# Patient Record
Sex: MALE | Race: WHITE | NOT HISPANIC OR LATINO
[De-identification: names, ages, dates, MRNs, and addresses within clinical notes are randomized per-mention and may not be internally consistent; named-entity substitution may affect disease eponyms.]

---

## 2017-09-01 PROBLEM — Z00.00 ENCOUNTER FOR PREVENTIVE HEALTH EXAMINATION: Status: ACTIVE | Noted: 2017-09-01

## 2017-09-19 ENCOUNTER — APPOINTMENT (OUTPATIENT)
Dept: PULMONOLOGY | Facility: CLINIC | Age: 67
End: 2017-09-19
Payer: MEDICARE

## 2017-09-19 VITALS — SYSTOLIC BLOOD PRESSURE: 126 MMHG | DIASTOLIC BLOOD PRESSURE: 82 MMHG

## 2017-09-19 VITALS — HEIGHT: 71 IN | WEIGHT: 211 LBS | BODY MASS INDEX: 29.54 KG/M2

## 2017-09-19 VITALS — HEART RATE: 54 BPM | OXYGEN SATURATION: 97 %

## 2017-09-19 DIAGNOSIS — Z86.39 PERSONAL HISTORY OF OTHER ENDOCRINE, NUTRITIONAL AND METABOLIC DISEASE: ICD-10-CM

## 2017-09-19 DIAGNOSIS — Z86.79 PERSONAL HISTORY OF OTHER DISEASES OF THE CIRCULATORY SYSTEM: ICD-10-CM

## 2017-09-19 DIAGNOSIS — Z82.49 FAMILY HISTORY OF ISCHEMIC HEART DISEASE AND OTHER DISEASES OF THE CIRCULATORY SYSTEM: ICD-10-CM

## 2017-09-19 PROCEDURE — 99205 OFFICE O/P NEW HI 60 MIN: CPT | Mod: 25

## 2017-09-19 PROCEDURE — 94010 BREATHING CAPACITY TEST: CPT

## 2017-09-19 RX ORDER — DIPHENHYDRAMINE HYDROCHLORIDE, ZINC ACETATE 20; 1 MG/G; MG/G
CREAM TOPICAL
Refills: 0 | Status: ACTIVE | COMMUNITY

## 2017-09-19 RX ORDER — FAMOTIDINE 20 MG/1
20 TABLET, FILM COATED ORAL
Refills: 0 | Status: ACTIVE | COMMUNITY

## 2017-09-19 RX ORDER — OMEGA-3/DHA/EPA/FISH OIL 300-1000MG
400 CAPSULE ORAL
Refills: 0 | Status: ACTIVE | COMMUNITY

## 2017-09-19 RX ORDER — DUTASTERIDE 0.5 MG/1
0.5 CAPSULE, LIQUID FILLED ORAL
Refills: 0 | Status: ACTIVE | COMMUNITY

## 2017-09-19 RX ORDER — MULTIVIT-MIN/FA/LYCOPEN/LUTEIN .4-300-25
TABLET ORAL
Refills: 0 | Status: ACTIVE | COMMUNITY

## 2017-09-19 RX ORDER — ROSUVASTATIN CALCIUM 10 MG/1
10 TABLET, FILM COATED ORAL
Refills: 0 | Status: ACTIVE | COMMUNITY

## 2017-09-19 RX ORDER — OMEGA-3S/DHA/EPA/FISH OIL 300-1000MG
CAPSULE ORAL
Refills: 0 | Status: ACTIVE | COMMUNITY

## 2017-10-22 ENCOUNTER — FORM ENCOUNTER (OUTPATIENT)
Age: 67
End: 2017-10-22

## 2017-10-23 ENCOUNTER — OUTPATIENT (OUTPATIENT)
Dept: OUTPATIENT SERVICES | Facility: HOSPITAL | Age: 67
LOS: 1 days | End: 2017-10-23
Payer: MEDICARE

## 2017-10-23 ENCOUNTER — APPOINTMENT (OUTPATIENT)
Dept: CT IMAGING | Facility: CLINIC | Age: 67
End: 2017-10-23
Payer: MEDICARE

## 2017-10-23 DIAGNOSIS — R91.8 OTHER NONSPECIFIC ABNORMAL FINDING OF LUNG FIELD: ICD-10-CM

## 2017-10-23 DIAGNOSIS — R05 COUGH: ICD-10-CM

## 2017-10-23 PROCEDURE — 71250 CT THORAX DX C-: CPT | Mod: 26

## 2017-10-23 PROCEDURE — 71250 CT THORAX DX C-: CPT

## 2017-12-15 ENCOUNTER — OUTPATIENT (OUTPATIENT)
Dept: OUTPATIENT SERVICES | Facility: HOSPITAL | Age: 67
LOS: 1 days | End: 2017-12-15
Payer: MEDICARE

## 2017-12-15 DIAGNOSIS — G47.33 OBSTRUCTIVE SLEEP APNEA (ADULT) (PEDIATRIC): ICD-10-CM

## 2017-12-15 PROCEDURE — 95811 POLYSOM 6/>YRS CPAP 4/> PARM: CPT

## 2017-12-15 PROCEDURE — 95811 POLYSOM 6/>YRS CPAP 4/> PARM: CPT | Mod: 26

## 2018-01-05 ENCOUNTER — APPOINTMENT (OUTPATIENT)
Dept: PULMONOLOGY | Facility: CLINIC | Age: 68
End: 2018-01-05
Payer: MEDICARE

## 2018-01-05 VITALS
HEART RATE: 58 BPM | BODY MASS INDEX: 29.4 KG/M2 | DIASTOLIC BLOOD PRESSURE: 78 MMHG | HEIGHT: 71 IN | WEIGHT: 210 LBS | SYSTOLIC BLOOD PRESSURE: 128 MMHG | OXYGEN SATURATION: 98 %

## 2018-01-05 PROCEDURE — 99214 OFFICE O/P EST MOD 30 MIN: CPT

## 2018-01-05 RX ORDER — METOPROLOL TARTRATE 50 MG/1
50 TABLET, FILM COATED ORAL
Refills: 0 | Status: DISCONTINUED | COMMUNITY
End: 2018-01-05

## 2018-05-21 ENCOUNTER — FORM ENCOUNTER (OUTPATIENT)
Age: 68
End: 2018-05-21

## 2018-05-21 ENCOUNTER — APPOINTMENT (OUTPATIENT)
Dept: PULMONOLOGY | Facility: CLINIC | Age: 68
End: 2018-05-21
Payer: MEDICARE

## 2018-05-21 VITALS
SYSTOLIC BLOOD PRESSURE: 124 MMHG | BODY MASS INDEX: 31.24 KG/M2 | WEIGHT: 224 LBS | DIASTOLIC BLOOD PRESSURE: 66 MMHG | HEART RATE: 51 BPM

## 2018-05-21 VITALS — OXYGEN SATURATION: 97 %

## 2018-05-21 PROCEDURE — 94060 EVALUATION OF WHEEZING: CPT

## 2018-05-21 PROCEDURE — 99215 OFFICE O/P EST HI 40 MIN: CPT | Mod: 25

## 2018-05-21 PROCEDURE — 94664 DEMO&/EVAL PT USE INHALER: CPT | Mod: 59

## 2018-05-22 ENCOUNTER — APPOINTMENT (OUTPATIENT)
Dept: CT IMAGING | Facility: CLINIC | Age: 68
End: 2018-05-22
Payer: MEDICARE

## 2018-05-22 ENCOUNTER — OUTPATIENT (OUTPATIENT)
Dept: OUTPATIENT SERVICES | Facility: HOSPITAL | Age: 68
LOS: 1 days | End: 2018-05-22
Payer: MEDICARE

## 2018-05-22 DIAGNOSIS — R91.8 OTHER NONSPECIFIC ABNORMAL FINDING OF LUNG FIELD: ICD-10-CM

## 2018-05-22 PROCEDURE — 71250 CT THORAX DX C-: CPT | Mod: 26

## 2018-05-22 PROCEDURE — 71250 CT THORAX DX C-: CPT

## 2018-06-27 ENCOUNTER — APPOINTMENT (OUTPATIENT)
Dept: PULMONOLOGY | Facility: CLINIC | Age: 68
End: 2018-06-27
Payer: MEDICARE

## 2018-06-27 VITALS
DIASTOLIC BLOOD PRESSURE: 46 MMHG | BODY MASS INDEX: 30.52 KG/M2 | SYSTOLIC BLOOD PRESSURE: 112 MMHG | HEART RATE: 48 BPM | HEIGHT: 71 IN | WEIGHT: 218 LBS | OXYGEN SATURATION: 97 %

## 2018-06-27 PROCEDURE — 99214 OFFICE O/P EST MOD 30 MIN: CPT

## 2018-06-27 RX ORDER — ALFUZOSIN HYDROCHLORIDE 10 MG/1
10 TABLET, EXTENDED RELEASE ORAL
Qty: 30 | Refills: 0 | Status: ACTIVE | COMMUNITY
Start: 2018-06-22

## 2018-08-20 ENCOUNTER — APPOINTMENT (OUTPATIENT)
Dept: PULMONOLOGY | Facility: CLINIC | Age: 68
End: 2018-08-20
Payer: MEDICARE

## 2018-08-20 VITALS
WEIGHT: 226 LBS | HEART RATE: 64 BPM | SYSTOLIC BLOOD PRESSURE: 130 MMHG | OXYGEN SATURATION: 97 % | HEIGHT: 71 IN | DIASTOLIC BLOOD PRESSURE: 70 MMHG | BODY MASS INDEX: 31.64 KG/M2

## 2018-08-20 PROCEDURE — 99214 OFFICE O/P EST MOD 30 MIN: CPT

## 2018-08-21 ENCOUNTER — APPOINTMENT (OUTPATIENT)
Dept: PULMONOLOGY | Facility: CLINIC | Age: 68
End: 2018-08-21
Payer: MEDICARE

## 2018-08-21 VITALS
HEIGHT: 71 IN | BODY MASS INDEX: 31.36 KG/M2 | SYSTOLIC BLOOD PRESSURE: 130 MMHG | WEIGHT: 224 LBS | HEART RATE: 51 BPM | DIASTOLIC BLOOD PRESSURE: 78 MMHG | OXYGEN SATURATION: 98 %

## 2018-08-21 PROCEDURE — 99215 OFFICE O/P EST HI 40 MIN: CPT

## 2018-12-20 ENCOUNTER — APPOINTMENT (OUTPATIENT)
Dept: PULMONOLOGY | Facility: CLINIC | Age: 68
End: 2018-12-20
Payer: MEDICARE

## 2018-12-20 VITALS
DIASTOLIC BLOOD PRESSURE: 74 MMHG | HEART RATE: 51 BPM | OXYGEN SATURATION: 98 % | SYSTOLIC BLOOD PRESSURE: 120 MMHG | WEIGHT: 220 LBS | BODY MASS INDEX: 30.68 KG/M2

## 2018-12-20 PROCEDURE — 99214 OFFICE O/P EST MOD 30 MIN: CPT

## 2018-12-20 RX ORDER — FLUTICASONE PROPIONATE AND SALMETEROL 50; 250 UG/1; UG/1
250-50 POWDER RESPIRATORY (INHALATION)
Refills: 0 | Status: DISCONTINUED | COMMUNITY
End: 2018-12-20

## 2018-12-20 RX ORDER — DONEPEZIL HYDROCHLORIDE 5 MG/1
5 TABLET ORAL
Refills: 0 | Status: ACTIVE | COMMUNITY

## 2019-05-21 ENCOUNTER — APPOINTMENT (OUTPATIENT)
Dept: PULMONOLOGY | Facility: CLINIC | Age: 69
End: 2019-05-21
Payer: MEDICARE

## 2019-05-21 VITALS
SYSTOLIC BLOOD PRESSURE: 110 MMHG | WEIGHT: 216 LBS | OXYGEN SATURATION: 97 % | DIASTOLIC BLOOD PRESSURE: 72 MMHG | BODY MASS INDEX: 30.13 KG/M2 | HEART RATE: 48 BPM | RESPIRATION RATE: 16 BRPM

## 2019-05-21 PROCEDURE — 99214 OFFICE O/P EST MOD 30 MIN: CPT

## 2019-05-21 RX ORDER — AZELASTINE HYDROCHLORIDE AND FLUTICASONE PROPIONATE 137; 50 UG/1; UG/1
137-50 SPRAY, METERED NASAL
Refills: 0 | Status: DISCONTINUED | COMMUNITY
End: 2019-05-21

## 2019-05-21 RX ORDER — ALBUTEROL SULFATE 90 UG/1
108 (90 BASE) AEROSOL, METERED RESPIRATORY (INHALATION)
Qty: 1 | Refills: 3 | Status: ACTIVE | COMMUNITY
Start: 2019-05-21 | End: 1900-01-01

## 2019-05-21 RX ORDER — FLUTICASONE PROPIONATE AND SALMETEROL 50; 250 UG/1; UG/1
250-50 POWDER RESPIRATORY (INHALATION)
Qty: 1 | Refills: 5 | Status: DISCONTINUED | COMMUNITY
Start: 2018-05-21 | End: 2019-05-21

## 2019-05-21 NOTE — DISCUSSION/SUMMARY
[FreeTextEntry1] : \par #1. CPAP at 11 cm of water to treat severe LENY but given residual moderate LENY increased to 14 cm of water with some improvement now down to a residual AHI of 12.8-14.9; he does not want to increase the pressure any higher given his symptomatic improvement but could consider autoCPAP if needed\par #2. Diet and exercise for weight loss\par #3. Replace equipment as needed; gave pt Airtouch F20 med FFM in the past and he feels that he is doing better with it despite the residual mild to moderate LENY noted on the compliance; consider AirFit F30 mask to improve response\par #4. F/u in 3 months with compliance\par #5. F/u with Dr. Villareal for other pulmonary issues\par #6. Flonase for PNDS\par #7. He is using SoClean\par \par Discussed above with patient and wife who was also present.

## 2019-05-21 NOTE — REASON FOR VISIT
[Follow-Up] : a follow-up visit [Sleep Apnea] : sleep apnea [Spouse] : spouse [FreeTextEntry2] : weight issues

## 2019-05-21 NOTE — CONSULT LETTER
[Dear  ___] : Dear  [unfilled], [Consult Letter:] : I had the pleasure of evaluating your patient, [unfilled]. [Please see my note below.] : Please see my note below. [Consult Closing:] : Thank you very much for allowing me to participate in the care of this patient.  If you have any questions, please do not hesitate to contact me. [Sincerely,] : Sincerely, [Logan Means MD] : Logan Means MD [FreeTextEntry3] : Logan Means MD, FCCP, KIT. ABSM\par

## 2019-05-21 NOTE — HISTORY OF PRESENT ILLNESS
[Intermittent] : intermittent [Doing Well] : doing well [Well Controlled] : Well controlled [Goals--Doing Well] : the patient is doing well with ~his/her~ asthma goals [Follow-Up - Routine Clinic] : a routine clinic follow-up of [None] : The patient is not currently being treated for this problem [FreeTextEntry1] : Patient c/o SOBOE but is otherwise without associated respiratory complaints.\par \par He is followed by Dr. Villareal for his abnormal chest CT/nodules which reportedly have been stable and for his asthma for which I have prescribed ProAir as needed.\par He reports improvement in his asthma and PNDS symptoms since starting CPAP with improvement in his cough.\par

## 2019-08-27 ENCOUNTER — APPOINTMENT (OUTPATIENT)
Dept: PULMONOLOGY | Facility: CLINIC | Age: 69
End: 2019-08-27

## 2019-09-03 ENCOUNTER — FORM ENCOUNTER (OUTPATIENT)
Age: 69
End: 2019-09-03

## 2019-09-04 ENCOUNTER — APPOINTMENT (OUTPATIENT)
Dept: PULMONOLOGY | Facility: CLINIC | Age: 69
End: 2019-09-04
Payer: MEDICARE

## 2019-09-04 ENCOUNTER — OUTPATIENT (OUTPATIENT)
Dept: OUTPATIENT SERVICES | Facility: HOSPITAL | Age: 69
LOS: 1 days | End: 2019-09-04
Payer: MEDICARE

## 2019-09-04 ENCOUNTER — APPOINTMENT (OUTPATIENT)
Dept: CT IMAGING | Facility: CLINIC | Age: 69
End: 2019-09-04

## 2019-09-04 VITALS
BODY MASS INDEX: 29.85 KG/M2 | SYSTOLIC BLOOD PRESSURE: 115 MMHG | DIASTOLIC BLOOD PRESSURE: 60 MMHG | HEART RATE: 47 BPM | WEIGHT: 214 LBS | OXYGEN SATURATION: 97 %

## 2019-09-04 DIAGNOSIS — R91.8 OTHER NONSPECIFIC ABNORMAL FINDING OF LUNG FIELD: ICD-10-CM

## 2019-09-04 PROCEDURE — 99214 OFFICE O/P EST MOD 30 MIN: CPT

## 2019-09-04 PROCEDURE — 71250 CT THORAX DX C-: CPT

## 2019-09-04 PROCEDURE — 71250 CT THORAX DX C-: CPT | Mod: 26

## 2019-09-04 NOTE — REVIEW OF SYSTEMS
[As Noted in HPI] : as noted in HPI [Negative] : Psychiatric [FreeTextEntry9] : recent cardiac cath negative

## 2019-09-04 NOTE — PHYSICAL EXAM
[General Appearance - Well Developed] : well developed [Normal Appearance] : normal appearance [General Appearance - Well Nourished] : well nourished [No Deformities] : no deformities [Normal Conjunctiva] : the conjunctiva exhibited no abnormalities [Normal Oropharynx] : normal oropharynx [II] : II [Neck Appearance] : the appearance of the neck was normal [Neck Cervical Mass (___cm)] : no neck mass was observed [Heart Rate And Rhythm] : heart rate and rhythm were normal [Murmurs] : no murmurs present [Heart Sounds] : normal S1 and S2 [Edema] : no peripheral edema present [Respiration, Rhythm And Depth] : normal respiratory rhythm and effort [Exaggerated Use Of Accessory Muscles For Inspiration] : no accessory muscle use [Auscultation Breath Sounds / Voice Sounds] : lungs were clear to auscultation bilaterally [Lungs Percussion] : the lungs were normal to percussion [Abnormal Walk] : normal gait [Nail Clubbing] : no clubbing of the fingernails [Cyanosis, Localized] : no localized cyanosis [No Focal Deficits] : no focal deficits [Oriented To Time, Place, And Person] : oriented to person, place, and time [Impaired Insight] : insight and judgment were intact [Affect] : the affect was normal [Memory Recent] : recent memory was not impaired [] : no rash [FreeTextEntry1] : no chest wall abn

## 2019-09-04 NOTE — DISCUSSION/SUMMARY
[FreeTextEntry1] : stable lung nodules, mild GG haziness bases, ? atelectasis vs early ILD\par will repeat CT scan with prone cuts\par has severe LENY ( AHI 62) now on cpap 14 improved\par Currently one exam without bronchospasm, no obstruction on spirometry\par quantiferon TB Gold, relative was in sanitar[facility] , no clinical active infection\par Reevaluate in 6-8  months or sooner if needed with pfts\par Cardiology follow up\par vaccinations this fall\par \par

## 2019-09-04 NOTE — CONSULT LETTER
[Dear  ___] : Dear  [unfilled], [Consult Letter:] : I had the pleasure of evaluating your patient, [unfilled]. [Consult Closing:] : Thank you very much for allowing me to participate in the care of this patient.  If you have any questions, please do not hesitate to contact me. [Please see my note below.] : Please see my note below. [Sincerely,] : Sincerely, [DrRomulo  ___] : Dr. FRANCO [Director, Respiratory Care] : Director, Respiratory Care [[facility]] : [facility], [facility] [[facility]] : [facility] [FreeTextEntry3] : [facility]\par Pulmonary Critical Care\par Director Pulmonary Division\par Medical Director Respiratory Therapy\par [facility]\par \par

## 2019-09-04 NOTE — HISTORY OF PRESENT ILLNESS
[FreeTextEntry1] : former smoker 6 yrs quit 1971\par doing well\par no pulmonary complaints\par doing well on cpap 14

## 2019-12-24 ENCOUNTER — APPOINTMENT (OUTPATIENT)
Dept: PULMONOLOGY | Facility: CLINIC | Age: 69
End: 2019-12-24
Payer: MEDICARE

## 2019-12-24 VITALS
DIASTOLIC BLOOD PRESSURE: 70 MMHG | WEIGHT: 220 LBS | BODY MASS INDEX: 30.8 KG/M2 | HEART RATE: 50 BPM | OXYGEN SATURATION: 93 % | SYSTOLIC BLOOD PRESSURE: 110 MMHG | HEIGHT: 71 IN

## 2019-12-24 PROCEDURE — 99214 OFFICE O/P EST MOD 30 MIN: CPT

## 2019-12-24 RX ORDER — MONTELUKAST 10 MG/1
TABLET, FILM COATED ORAL
Refills: 0 | Status: DISCONTINUED | COMMUNITY
End: 2019-12-24

## 2019-12-24 RX ORDER — AZELASTINE HYDROCHLORIDE AND FLUTICASONE PROPIONATE 137; 50 UG/1; UG/1
137-50 SPRAY, METERED NASAL
Qty: 1 | Refills: 3 | Status: DISCONTINUED | COMMUNITY
Start: 2018-05-21 | End: 2019-12-24

## 2019-12-24 RX ORDER — ASPIRIN 81 MG
81 TABLET, DELAYED RELEASE (ENTERIC COATED) ORAL
Refills: 0 | Status: DISCONTINUED | COMMUNITY
End: 2019-12-24

## 2019-12-24 NOTE — HISTORY OF PRESENT ILLNESS
[Intermittent] : intermittent [Doing Well] : doing well [Well Controlled] : Well controlled [Goals--Doing Well] : the patient is doing well with ~his/her~ asthma goals [Follow-Up - Routine Clinic] : a routine clinic follow-up of [None] : No associated symptoms are reported [FreeTextEntry1] : Patient c/o SOBOE but is otherwise without associated respiratory complaints.\par \par He is followed by Dr. Villareal for his abnormal chest CT/nodules which reportedly have been stable and for his asthma for which I have prescribed ProAir as needed.\par He reports improvement in his asthma and PNDS and GERD symptoms since starting CPAP with improvement in his cough.\par He reports that he will be undergoing cataract surgery in the near future and may be off of his CPAP therapy for 2 weeks. [CPAP] : CPAP [Good Tolerance] : good tolerance of treatment [Good Symptom Control] : good symptom control [Good Compliance] : good compliance with treatment [de-identified] : at 14 cm of water

## 2019-12-24 NOTE — DISCUSSION/SUMMARY
[FreeTextEntry1] : \par #1. CPAP at 11 cm of water to treat severe LENY but given residual moderate LENY increased to 14 cm of water with some improvement now down to a residual AHI of 12.9; he does not want to increase the pressure any higher given his symptomatic improvement but could consider autoCPAP if needed though he is significantly improved from his baseline of 62.5 and clinically is improved\par #2. Diet and exercise for weight loss\par #3. Replace equipment as needed; gave pt Airtouch F20 med FFM in the past and he feels that he is doing better with it despite the residual mild to moderate LENY noted on the compliance; He tried AirFit F30 mask but did not tolerate so is using his Airtouch mask again. Gave pt AirFit F20 to try to decrease leak and improve compliance\par #4. F/u in May upon return from [place] with compliance\par #5. F/u with Dr. Villareal for other pulmonary issues\par #6. Flonase for PNDS\par #7. He is using SoClean

## 2019-12-24 NOTE — REVIEW OF SYSTEMS
[Nasal Congestion] : nasal congestion [Postnasal Drip] : postnasal drip [Cough] : cough [Sputum] : sputum  [Hypertension] : ~T hypertension [As Noted in HPI] : as noted in HPI [Dry Eyes] : no dryness of the eyes [Fever] : no fever [Chills] : no chills [Sinus Problems] : no sinus problems [Epistaxis] : no nosebleeds [Eye Irritation] : no ~T irritation of the eyes [Pleuritic Pain] : no pleuritic pain [Dyspnea] : no dyspnea [Chest Tightness] : no chest tightness [Chest Discomfort] : no chest discomfort [Wheezing] : no wheezing [Murmurs] : no murmurs were heard [Dysrhythmia] : no dysrhythmia [Palpitations] : no palpitations [Hay Fever] : no hay fever [Edema] : ~T edema was not present [Itchy Eyes] : no itching of ~T the eyes [Reflux] : no reflux [Constipation] : no constipation [Vomiting] : no vomiting [Nausea] : no nausea [Diarrhea] : no diarrhea [Trauma] : no ~T physical trauma [Abdominal Pain] : no abdominal pain [Dysuria] : no dysuria [Fracture] : no fracture [Anemia] : no anemia [Dizziness] : no dizziness [Headache] : no headache [Syncope] : no fainting [Numbness] : no numbness [Paralysis] : no paralysis was seen [Depression] : no depression [Seizures] : no seizures [Diabetes] : no diabetes mellitus [Thyroid Problem] : no thyroid problem [Anxiety] : no anxiety

## 2019-12-24 NOTE — PHYSICAL EXAM
[General Appearance - Well Developed] : well developed [General Appearance - In No Acute Distress] : no acute distress [Normal Appearance] : normal appearance [Normal Conjunctiva] : the conjunctiva exhibited no abnormalities [Low Lying Soft Palate] : low lying soft palate [Elongated Uvula] : elongated uvula [Enlarged Base of the Tongue] : enlargement of the base of the tongue [III] : III [Heart Rate And Rhythm] : heart rate and rhythm were normal [Neck Appearance] : the appearance of the neck was normal [Murmurs] : no murmurs present [Edema] : no peripheral edema present [Heart Sounds] : normal S1 and S2 [Respiration, Rhythm And Depth] : normal respiratory rhythm and effort [] : no respiratory distress [Auscultation Breath Sounds / Voice Sounds] : lungs were clear to auscultation bilaterally [Exaggerated Use Of Accessory Muscles For Inspiration] : no accessory muscle use [Abdomen Soft] : soft [Abdomen Tenderness] : non-tender [Cyanosis, Localized] : no localized cyanosis [Abnormal Walk] : normal gait [Nail Clubbing] : no clubbing of the fingernails [No Focal Deficits] : no focal deficits [Oriented To Time, Place, And Person] : oriented to person, place, and time [FreeTextEntry1] : No abnormalities.

## 2020-05-18 ENCOUNTER — APPOINTMENT (OUTPATIENT)
Dept: PULMONOLOGY | Facility: CLINIC | Age: 70
End: 2020-05-18

## 2020-06-24 ENCOUNTER — APPOINTMENT (OUTPATIENT)
Dept: PULMONOLOGY | Facility: CLINIC | Age: 70
End: 2020-06-24
Payer: MEDICARE

## 2020-06-24 VITALS
OXYGEN SATURATION: 98 % | DIASTOLIC BLOOD PRESSURE: 78 MMHG | WEIGHT: 210 LBS | HEIGHT: 70 IN | SYSTOLIC BLOOD PRESSURE: 128 MMHG | BODY MASS INDEX: 30.06 KG/M2 | RESPIRATION RATE: 16 BRPM | HEART RATE: 56 BPM

## 2020-06-24 PROCEDURE — 99214 OFFICE O/P EST MOD 30 MIN: CPT

## 2020-06-24 RX ORDER — NEBIVOLOL HYDROCHLORIDE 10 MG/1
10 TABLET ORAL
Refills: 0 | Status: DISCONTINUED | COMMUNITY
Start: 2018-05-21 | End: 2020-06-24

## 2020-06-24 RX ORDER — AMLODIPINE BESYLATE 5 MG/1
5 TABLET ORAL
Refills: 0 | Status: ACTIVE | COMMUNITY

## 2020-06-24 NOTE — DISCUSSION/SUMMARY
[FreeTextEntry1] : stable lung nodules, mild GG haziness bases, improved on last CT scan 9/19, meets Fleischner criteria for stability\par has severe LENY ( AHI 62) now on cpap 14 improved\par Currently one exam without bronchospasm, no obstruction on spirometry\par quantiferon TB Gold negative, last spirometry was normal\par Reevaluate in 6-8  months or sooner if needed\par Cardiology follow up\par vaccinations this fall\par \par

## 2020-06-24 NOTE — PHYSICAL EXAM
[General Appearance - Well Nourished] : well nourished [Normal Appearance] : normal appearance [General Appearance - Well Developed] : well developed [Normal Oropharynx] : normal oropharynx [No Deformities] : no deformities [Normal Conjunctiva] : the conjunctiva exhibited no abnormalities [Neck Appearance] : the appearance of the neck was normal [II] : II [Heart Rate And Rhythm] : heart rate and rhythm were normal [Neck Cervical Mass (___cm)] : no neck mass was observed [Murmurs] : no murmurs present [Edema] : no peripheral edema present [Heart Sounds] : normal S1 and S2 [Auscultation Breath Sounds / Voice Sounds] : lungs were clear to auscultation bilaterally [Respiration, Rhythm And Depth] : normal respiratory rhythm and effort [Exaggerated Use Of Accessory Muscles For Inspiration] : no accessory muscle use [Lungs Percussion] : the lungs were normal to percussion [Abnormal Walk] : normal gait [Cyanosis, Localized] : no localized cyanosis [Nail Clubbing] : no clubbing of the fingernails [No Focal Deficits] : no focal deficits [Oriented To Time, Place, And Person] : oriented to person, place, and time [Memory Recent] : recent memory was not impaired [Impaired Insight] : insight and judgment were intact [] : no rash [Affect] : the affect was normal [FreeTextEntry1] : no chest wall abn

## 2020-06-24 NOTE — HISTORY OF PRESENT ILLNESS
[TextBox_4] : former smoker 6 yrs quit 1971\par  doing well\par  no pulmonary complaints doing well on cpap 14\par no fever, chill, chest pain\par no sig sputum\par recently returned from [place]\par no covid exposures

## 2020-06-24 NOTE — CONSULT LETTER
[Dear  ___] : Dear  [unfilled], [Consult Letter:] : I had the pleasure of evaluating your patient, [unfilled]. [Please see my note below.] : Please see my note below. [Consult Closing:] : Thank you very much for allowing me to participate in the care of this patient.  If you have any questions, please do not hesitate to contact me. [Sincerely,] : Sincerely, [Director, Respiratory Care] : Director, Respiratory Care [[facility], [facility]] : [facility], [facility] [[facility]] : [facility] [FreeTextEntry3] : [facility]\par Pulmonary Critical Care\par Director Pulmonary Division\par Medical Director Respiratory Therapy\par [facility]\par \par  [DrRomulo  ___] : Dr. FRANCO

## 2020-06-30 ENCOUNTER — APPOINTMENT (OUTPATIENT)
Dept: PULMONOLOGY | Facility: CLINIC | Age: 70
End: 2020-06-30
Payer: MEDICARE

## 2020-06-30 VITALS — OXYGEN SATURATION: 97 % | HEART RATE: 54 BPM | DIASTOLIC BLOOD PRESSURE: 76 MMHG | SYSTOLIC BLOOD PRESSURE: 120 MMHG

## 2020-06-30 PROCEDURE — 99214 OFFICE O/P EST MOD 30 MIN: CPT

## 2020-06-30 NOTE — CONSULT LETTER
[Dear  ___] : Dear  [unfilled], [Consult Letter:] : I had the pleasure of evaluating your patient, [unfilled]. [Please see my note below.] : Please see my note below. [Consult Closing:] : Thank you very much for allowing me to participate in the care of this patient.  If you have any questions, please do not hesitate to contact me. [Sincerely,] : Sincerely, [Logan Means MD] : Logan Means MD [FreeTextEntry3] : Logan Means MD, FCCP, D. ABSM\par Pulmonary and Sleep Medicine\par [facility] [place]

## 2020-06-30 NOTE — REVIEW OF SYSTEMS
[Nasal Congestion] : nasal congestion [Cough] : cough [Postnasal Drip] : postnasal drip [Sputum] : sputum  [Hypertension] : ~T hypertension [As Noted in HPI] : as noted in HPI [Fever] : no fever [Chills] : no chills [Dry Eyes] : no dryness of the eyes [Eye Irritation] : no ~T irritation of the eyes [Epistaxis] : no nosebleeds [Sinus Problems] : no sinus problems [Chest Tightness] : no chest tightness [Dyspnea] : no dyspnea [Wheezing] : no wheezing [Chest Discomfort] : no chest discomfort [Pleuritic Pain] : no pleuritic pain [Dysrhythmia] : no dysrhythmia [Murmurs] : no murmurs were heard [Palpitations] : no palpitations [Hay Fever] : no hay fever [Edema] : ~T edema was not present [Itchy Eyes] : no itching of ~T the eyes [Nausea] : no nausea [Reflux] : no reflux [Vomiting] : no vomiting [Constipation] : no constipation [Diarrhea] : no diarrhea [Abdominal Pain] : no abdominal pain [Dysuria] : no dysuria [Trauma] : no ~T physical trauma [Fracture] : no fracture [Anemia] : no anemia [Headache] : no headache [Dizziness] : no dizziness [Numbness] : no numbness [Syncope] : no fainting [Paralysis] : no paralysis was seen [Seizures] : no seizures [Anxiety] : no anxiety [Depression] : no depression [Diabetes] : no diabetes mellitus [Thyroid Problem] : no thyroid problem

## 2020-06-30 NOTE — PHYSICAL EXAM
[General Appearance - Well Developed] : well developed [Normal Appearance] : normal appearance [Low Lying Soft Palate] : low lying soft palate [General Appearance - In No Acute Distress] : no acute distress [Normal Conjunctiva] : the conjunctiva exhibited no abnormalities [Elongated Uvula] : elongated uvula [Enlarged Base of the Tongue] : enlargement of the base of the tongue [III] : III [Neck Appearance] : the appearance of the neck was normal [Heart Rate And Rhythm] : heart rate and rhythm were normal [Heart Sounds] : normal S1 and S2 [Murmurs] : no murmurs present [Edema] : no peripheral edema present [Exaggerated Use Of Accessory Muscles For Inspiration] : no accessory muscle use [Respiration, Rhythm And Depth] : normal respiratory rhythm and effort [] : no respiratory distress [Auscultation Breath Sounds / Voice Sounds] : lungs were clear to auscultation bilaterally [Abdomen Soft] : soft [Abdomen Tenderness] : non-tender [Abnormal Walk] : normal gait [Nail Clubbing] : no clubbing of the fingernails [Cyanosis, Localized] : no localized cyanosis [Oriented To Time, Place, And Person] : oriented to person, place, and time [No Focal Deficits] : no focal deficits [FreeTextEntry1] : No abnormalities.

## 2020-06-30 NOTE — HISTORY OF PRESENT ILLNESS
[Doing Well] : doing well [Intermittent] : intermittent [Well Controlled] : Well controlled [Goals--Doing Well] : the patient is doing well with ~his/her~ asthma goals [Follow-Up - Routine Clinic] : a routine clinic follow-up of [CPAP] : CPAP [None] : No associated symptoms are reported [Good Compliance] : good compliance with treatment [Good Symptom Control] : good symptom control [Good Tolerance] : good tolerance of treatment [FreeTextEntry1] : Patient c/o SOBOE but is otherwise without associated respiratory complaints.\par \par He is followed by Dr. Villareal for his abnormal chest CT/nodules which reportedly have been stable and for his asthma for which I have prescribed ProAir as needed.\par He reports improvement in his asthma and PNDS and GERD symptoms since starting CPAP with improvement in his cough.\par He reports that he will be undergoing cataract surgery in the near future and may be off of his CPAP therapy for 2 weeks. [de-identified] : at 14 cm of water

## 2020-06-30 NOTE — DISCUSSION/SUMMARY
[FreeTextEntry1] : \par #1. CPAP at 11 cm of water to treat severe LENY but given residual moderate LENY increased to 14 cm of water with some improvement now down to a residual AHI of 12.7; he is willing to try autoCPAP 12-18 cm of water though he is significantly improved from his baseline of 62.5 and clinically is improved\par #2. Diet and exercise for weight loss\par #3. Replace equipment as needed; gave pt Airtouch F20 med FFM in the past and he feels that he is doing better with it despite the residual mild to moderate LENY noted on the compliance; He tried AirFit F30 mask but did not tolerate so is using his Airtouch mask again. Gave pt AirFit F20 to try to decrease leak and improve compliance; ordered 6/30/20\par #4. F/u in 3 months to assess response to autoCPAP and prior to leaving for [place] with compliance\par #5. F/u with Dr. Villareal for other pulmonary issues including nodules and atelectasis on chest CT and for h/o asthma\par #6. Flonase for PNDS\par #7. He is using SoClean\par #8. Reviewed risks of exposure and symptoms of Covid-19 virus, including how the virus is spread.\par \par Discussed the following risk-reducing strategies:\par -Wash hands with soap and water (proper technique reviewed) \par -Use alcohol based  when hand-washing is not an option\par -Maintain a social distance of at least 6 feet whenever possible\par -Avoid contact, especially hand shaking\par -Avoid touching eyes, nose, and mouth\par -Cover face/mouth when coughing or sneezing\par -Avoid close contact with sick people\par -Seek medical help if you develop a fever and/or respiratory symptoms (e.g. cough, chest pain, SOB)\par \par Patient's questions were answered and patient appears to understand these recommendations

## 2020-09-17 ENCOUNTER — APPOINTMENT (OUTPATIENT)
Dept: PULMONOLOGY | Facility: CLINIC | Age: 70
End: 2020-09-17
Payer: MEDICARE

## 2020-09-17 VITALS
DIASTOLIC BLOOD PRESSURE: 68 MMHG | HEIGHT: 70 IN | RESPIRATION RATE: 16 BRPM | HEART RATE: 61 BPM | WEIGHT: 207 LBS | SYSTOLIC BLOOD PRESSURE: 114 MMHG | OXYGEN SATURATION: 98 % | BODY MASS INDEX: 29.63 KG/M2

## 2020-09-17 PROCEDURE — 99214 OFFICE O/P EST MOD 30 MIN: CPT

## 2020-09-17 NOTE — DISCUSSION/SUMMARY
[FreeTextEntry1] : \par #1. CPAP at 11 cm of water to treat severe LENY but given residual moderate LENY increased to 14 cm of water with some improvement now down to a residual AHI of 12.2; he is willing to try autoCPAP 14-18 cm of water though he is significantly improved from his baseline of 62.5 and clinically is improved\par #2. Diet and exercise for weight loss\par #3. Replace equipment as needed; gave pt Airtouch F20 med FFM in the past and he feels that he is doing better with it despite the residual mild to moderate LENY noted on the compliance; He tried AirFit F30 mask but did not tolerate so is using his Airtouch mask again. Gave pt AirFit F20 to try to decrease leak and improve compliance; ordered 6/30/20\par #4. F/u in 3 months to assess response to autoCPAP and after returning from [place] with compliance\par #5. F/u with Dr. Villareal for other pulmonary issues including nodules and atelectasis on chest CT and for h/o asthma\par #6. Flonase for PNDS\par #7. He is using SoClean\par #8. Reviewed risks of exposure and symptoms of Covid-19 virus, including how the virus is spread.\par \par Discussed the following risk-reducing strategies:\par -Wash hands with soap and water (proper technique reviewed) \par -Use alcohol based  when hand-washing is not an option\par -Maintain a social distance of at least 6 feet whenever possible\par -Avoid contact, especially hand shaking\par -Avoid touching eyes, nose, and mouth\par -Cover face/mouth when coughing or sneezing\par -Avoid close contact with sick people\par -Seek medical help if you develop a fever and/or respiratory symptoms (e.g. cough, chest pain, SOB)\par \par Patient's questions were answered and patient appears to understand these recommendations

## 2020-09-17 NOTE — HISTORY OF PRESENT ILLNESS
[Intermittent] : intermittent [Doing Well] : doing well [Well Controlled] : Well controlled [Goals--Doing Well] : the patient is doing well with ~his/her~ asthma goals [Follow-Up - Routine Clinic] : a routine clinic follow-up of [None] : No associated symptoms are reported [CPAP] : CPAP [Good Compliance] : good compliance with treatment [Good Tolerance] : good tolerance of treatment [Good Symptom Control] : good symptom control [FreeTextEntry1] : Patient c/o SOBOE but is otherwise without associated respiratory complaints.\par \par He is followed by Dr. Villareal for his abnormal chest CT/nodules which reportedly have been stable and for his asthma for which I have prescribed ProAir as needed.\par He reports improvement in his asthma and PNDS and GERD symptoms since starting CPAP with improvement in his cough.\par He reports that he will be undergoing cataract surgery in the near future and may be off of his CPAP therapy for 2 weeks. [de-identified] : at 14 cm of water

## 2020-09-17 NOTE — CONSULT LETTER
[Dear  ___] : Dear  [unfilled], [Consult Letter:] : I had the pleasure of evaluating your patient, [unfilled]. [Please see my note below.] : Please see my note below. [Consult Closing:] : Thank you very much for allowing me to participate in the care of this patient.  If you have any questions, please do not hesitate to contact me. [Sincerely,] : Sincerely, [Logan Means MD] : Logan Means MD [FreeTextEntry3] : Logan Means MD, FCCP, D. ABSM\par Pulmonary and Sleep Medicine\par [facility] at [place]

## 2020-09-17 NOTE — REVIEW OF SYSTEMS
[Nasal Congestion] : nasal congestion [Postnasal Drip] : postnasal drip [Cough] : cough [Sputum] : sputum  [Hypertension] : ~T hypertension [As Noted in HPI] : as noted in HPI [Fever] : no fever [Chills] : no chills [Dry Eyes] : no dryness of the eyes [Eye Irritation] : no ~T irritation of the eyes [Epistaxis] : no nosebleeds [Sinus Problems] : no sinus problems [Dyspnea] : no dyspnea [Chest Tightness] : no chest tightness [Pleuritic Pain] : no pleuritic pain [Wheezing] : no wheezing [Chest Discomfort] : no chest discomfort [Dysrhythmia] : no dysrhythmia [Murmurs] : no murmurs were heard [Palpitations] : no palpitations [Edema] : ~T edema was not present [Hay Fever] : no hay fever [Itchy Eyes] : no itching of ~T the eyes [Reflux] : no reflux [Nausea] : no nausea [Vomiting] : no vomiting [Constipation] : no constipation [Diarrhea] : no diarrhea [Abdominal Pain] : no abdominal pain [Dysuria] : no dysuria [Trauma] : no ~T physical trauma [Fracture] : no fracture [Anemia] : no anemia [Headache] : no headache [Dizziness] : no dizziness [Syncope] : no fainting [Numbness] : no numbness [Paralysis] : no paralysis was seen [Seizures] : no seizures [Depression] : no depression [Anxiety] : no anxiety [Diabetes] : no diabetes mellitus [Thyroid Problem] : no thyroid problem

## 2020-12-21 ENCOUNTER — APPOINTMENT (OUTPATIENT)
Dept: PULMONOLOGY | Facility: CLINIC | Age: 70
End: 2020-12-21
Payer: MEDICARE

## 2020-12-21 ENCOUNTER — APPOINTMENT (OUTPATIENT)
Dept: PULMONOLOGY | Facility: CLINIC | Age: 70
End: 2020-12-21

## 2020-12-21 VITALS
WEIGHT: 209 LBS | HEART RATE: 52 BPM | BODY MASS INDEX: 29.92 KG/M2 | OXYGEN SATURATION: 99 % | RESPIRATION RATE: 14 BRPM | DIASTOLIC BLOOD PRESSURE: 76 MMHG | SYSTOLIC BLOOD PRESSURE: 128 MMHG | HEIGHT: 70 IN

## 2020-12-21 PROCEDURE — 99214 OFFICE O/P EST MOD 30 MIN: CPT

## 2020-12-21 RX ORDER — SILDENAFIL 20 MG/1
20 TABLET ORAL
Qty: 90 | Refills: 0 | Status: ACTIVE | COMMUNITY
Start: 2020-01-29

## 2020-12-21 NOTE — DISCUSSION/SUMMARY
[FreeTextEntry1] : \par #1. AutoCPAP at 14-18 cm of water to treat severe LENY with an AHI of 62.5. He was initially started on CPAP but changed to autoCPAP for mild to moderate LENY. He still has mild LENY with an AHI 8.7 but improved from previous\par #2. Diet and exercise for weight loss\par #3. Replace equipment as needed; gave pt Airtouch F20 med FFM in the past and he feels that he is doing better with it despite the residual mild to moderate LENY noted on the compliance; He tried AirFit F30 mask but did not tolerate so is using his Airtouch mask again. Gave pt AirFit F20 to try to decrease leak and improve compliance; ordered 6/30/20\par #4. F/u in 6 months to assess response to autoCPAP and after returning from [place] with compliance\par #5. F/u with Dr. Villareal for other pulmonary issues including nodules and atelectasis on chest CT and for h/o asthma\par #6. Flonase for PNDS\par #7. He is using SoClean\par #8. Reviewed risks of exposure and symptoms of Covid-19 virus, including how the virus is spread and precautions to avoid emily virus.\par \par Patient's questions were answered and patient appears to understand these recommendations

## 2020-12-21 NOTE — CONSULT LETTER
[Dear  ___] : Dear  [unfilled], [Consult Letter:] : I had the pleasure of evaluating your patient, [unfilled]. [Please see my note below.] : Please see my note below. [Consult Closing:] : Thank you very much for allowing me to participate in the care of this patient.  If you have any questions, please do not hesitate to contact me. [Sincerely,] : Sincerely, [Logan Means MD] : Logan Means MD [FreeTextEntry3] : Logan Means MD, FCCP, D. ABSM\par Pulmonary and Sleep Medicine\par [facility] at [place]

## 2020-12-21 NOTE — HISTORY OF PRESENT ILLNESS
[Intermittent] : intermittent [Doing Well] : doing well [Well Controlled] : Well controlled [Goals--Doing Well] : the patient is doing well with ~his/her~ asthma goals [Follow-Up - Routine Clinic] : a routine clinic follow-up of [None] : No associated symptoms are reported [CPAP] : CPAP [Good Compliance] : good compliance with treatment [Good Tolerance] : good tolerance of treatment [Good Symptom Control] : good symptom control [FreeTextEntry1] : Patient c/o SOBOE but is otherwise without associated respiratory complaints.\par \par He is followed by Dr. Villareal for his abnormal chest CT/nodules which reportedly have been stable and for his asthma for which I have prescribed ProAir as needed.\par He reports improvement in his asthma and PNDS and GERD symptoms since starting CPAP with improvement in his cough.\par He reports that he will be undergoing cataract surgery in the near future and may be off of his CPAP therapy for 2 weeks. [de-identified] : at 14 cm of water

## 2021-06-04 ENCOUNTER — APPOINTMENT (OUTPATIENT)
Dept: PULMONOLOGY | Facility: CLINIC | Age: 71
End: 2021-06-04

## 2021-06-08 ENCOUNTER — APPOINTMENT (OUTPATIENT)
Dept: PULMONOLOGY | Facility: CLINIC | Age: 71
End: 2021-06-08
Payer: MEDICARE

## 2021-06-08 VITALS
DIASTOLIC BLOOD PRESSURE: 70 MMHG | RESPIRATION RATE: 16 BRPM | OXYGEN SATURATION: 98 % | WEIGHT: 213 LBS | SYSTOLIC BLOOD PRESSURE: 120 MMHG | HEART RATE: 54 BPM | BODY MASS INDEX: 30.56 KG/M2

## 2021-06-08 PROCEDURE — 99214 OFFICE O/P EST MOD 30 MIN: CPT

## 2021-06-08 RX ORDER — MUPIROCIN 20 MG/G
2 OINTMENT TOPICAL
Qty: 22 | Refills: 0 | Status: COMPLETED | COMMUNITY
Start: 2020-12-21

## 2021-06-08 NOTE — HISTORY OF PRESENT ILLNESS
[Intermittent] : intermittent [Doing Well] : doing well [Well Controlled] : Well controlled [Goals--Doing Well] : the patient is doing well with ~his/her~ asthma goals [Follow-Up - Routine Clinic] : a routine clinic follow-up of [CPAP] : CPAP [Good Compliance] : good compliance with treatment [Good Tolerance] : good tolerance of treatment [Good Symptom Control] : good symptom control [On ___] : performed on [unfilled] [Patient] : the patient [To Assess ___] : to assess [unfilled] [None] : no new symptoms reported [FreeTextEntry1] : Patient c/o SOBOE but is otherwise without associated respiratory complaints.\par \par He was followed by Dr. Villareal for his abnormal chest CT/nodules which reportedly have been stable and for his asthma for which I have prescribed ProAir as needed.\par He reports improvement in his asthma and PNDS and GERD symptoms since starting CPAP with improvement in his cough as well. [de-identified] : at 14 cm of water  [FreeTextEntry9] : Chest CT [FreeTextEntry8] : Resolution of GGO seen 5/22/18

## 2021-06-08 NOTE — REASON FOR VISIT
[Follow-Up] : a follow-up visit [Abnormal CXR/ Chest CT] : an abnormal CXR/ chest CT [Asthma] : asthma [Sleep Apnea] : sleep apnea [Shortness of Breath] : shortness of breath

## 2021-06-08 NOTE — CONSULT LETTER
[Dear  ___] : Dear  [unfilled], [Consult Letter:] : I had the pleasure of evaluating your patient, [unfilled]. [Please see my note below.] : Please see my note below. [Consult Closing:] : Thank you very much for allowing me to participate in the care of this patient.  If you have any questions, please do not hesitate to contact me. [Sincerely,] : Sincerely, [FreeTextEntry3] : Logan Means MD, FCCP, D. ABSM\par Pulmonary and Sleep Medicine\par [facility] at [place]

## 2021-06-08 NOTE — DISCUSSION/SUMMARY
[FreeTextEntry1] : \par #1. AutoCPAP at 14-18 cm of water to treat severe LENY with an AHI of 62.5. He was initially started on CPAP but changed to autoCPAP for mild to moderate LENY. He still has mild LENY with an AHI 8.7 but now improved from previous at the current pressures\par #2. Diet and exercise for weight loss\par #3. Replace equipment as needed; gave pt Airtouch F20 med FFM in the past and he feels that he is doing better with it despite the residual mild to moderate LENY noted on the compliance; He tried AirFit F30 mask but did not tolerate so is using his Airtouch mask again. Gave pt AirFit F20 to try to decrease leak and improve compliance; ordered 6/30/20\par #4. F/u in 4 months to reassess response to autoCPAP\par #5. ProAir as needed for ? intermittent asthma\par #6. Flonase for PNDS\par #7. He is using SoClean\par #8. Pt had both Covid vaccines \par #9. Prior spirometry was normal; will repeat PFTs\par #10. Reviewed risks of exposure and symptoms of Covid-19 virus, including how the virus is spread and precautions to avoid emily virus.\par \par Patient's questions were answered and patient appears to understand these recommendations

## 2021-07-28 ENCOUNTER — NON-APPOINTMENT (OUTPATIENT)
Age: 71
End: 2021-07-28

## 2021-08-01 ENCOUNTER — TRANSCRIPTION ENCOUNTER (OUTPATIENT)
Age: 71
End: 2021-08-01

## 2021-08-23 ENCOUNTER — EMERGENCY (EMERGENCY)
Facility: HOSPITAL | Age: 71
LOS: 1 days | Discharge: HOME | End: 2021-08-23
Attending: EMERGENCY MEDICINE
Payer: COMMERCIAL

## 2021-08-23 VITALS
RESPIRATION RATE: 18 BRPM | TEMPERATURE: 99 F | SYSTOLIC BLOOD PRESSURE: 180 MMHG | DIASTOLIC BLOOD PRESSURE: 83 MMHG | HEART RATE: 47 BPM | OXYGEN SATURATION: 98 %

## 2021-08-23 PROCEDURE — 99285 EMERGENCY DEPT VISIT HI MDM: CPT

## 2021-08-23 PROCEDURE — 70450 CT HEAD/BRAIN W/O DYE: CPT | Mod: 26,MH

## 2021-08-23 PROCEDURE — 72125 CT NECK SPINE W/O DYE: CPT | Mod: 26,MH

## 2021-08-23 PROCEDURE — 70450 CT HEAD/BRAIN W/O DYE: CPT

## 2021-08-23 PROCEDURE — 99284 EMERGENCY DEPT VISIT MOD MDM: CPT | Mod: 25

## 2021-08-23 PROCEDURE — 72125 CT NECK SPINE W/O DYE: CPT

## 2021-08-23 RX ORDER — METHOCARBAMOL 500 MG/1
500 TABLET, FILM COATED ORAL ONCE
Refills: 0 | Status: COMPLETED | OUTPATIENT
Start: 2021-08-23 | End: 2021-08-23

## 2021-08-23 RX ORDER — IBUPROFEN 200 MG
600 TABLET ORAL ONCE
Refills: 0 | Status: COMPLETED | OUTPATIENT
Start: 2021-08-23 | End: 2021-08-23

## 2021-08-23 RX ADMIN — METHOCARBAMOL 500 MILLIGRAM(S): 500 TABLET, FILM COATED ORAL at 23:19

## 2021-08-23 RX ADMIN — Medication 600 MILLIGRAM(S): at 23:19
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
## 2021-08-23 NOTE — ED PROVIDER NOTE - PATIENT PORTAL LINK FT
You can access the FollowMyHealth Patient Portal offered by [facility] by registering at the following website: [email or web address]. By joining [facility]’s FollowMyHealth portal, you will also be able to view your health information using other applications (apps) compatible with our system.
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
## 2021-08-24 RX ORDER — METHOCARBAMOL 500 MG/1
1 TABLET, FILM COATED ORAL
Qty: 20 | Refills: 0
Start: 2021-08-24 | End: 2021-08-28

## 2021-09-28 PROBLEM — Z78.9 OTHER SPECIFIED HEALTH STATUS: Chronic | Status: ACTIVE | Noted: 2021-08-24

## 2021-12-22 ENCOUNTER — APPOINTMENT (OUTPATIENT)
Dept: PULMONOLOGY | Facility: CLINIC | Age: 71
End: 2021-12-22

## 2022-05-25 ENCOUNTER — APPOINTMENT (OUTPATIENT)
Dept: PULMONOLOGY | Facility: CLINIC | Age: 72
End: 2022-05-25
Payer: MEDICARE

## 2022-05-25 VITALS
SYSTOLIC BLOOD PRESSURE: 118 MMHG | BODY MASS INDEX: 30.49 KG/M2 | OXYGEN SATURATION: 97 % | HEIGHT: 70 IN | WEIGHT: 213 LBS | HEART RATE: 58 BPM | RESPIRATION RATE: 16 BRPM | DIASTOLIC BLOOD PRESSURE: 64 MMHG

## 2022-05-25 DIAGNOSIS — J84.10 PULMONARY FIBROSIS, UNSPECIFIED: ICD-10-CM

## 2022-05-25 PROCEDURE — 99214 OFFICE O/P EST MOD 30 MIN: CPT

## 2022-05-25 RX ORDER — MULTIVIT-MIN/FOLIC/VIT K/LYCOP 400-300MCG
1000 TABLET ORAL
Refills: 0 | Status: DISCONTINUED | COMMUNITY
End: 2022-05-25

## 2022-05-25 RX ORDER — OLOPATADINE HCL 1 MG/ML
0.1 SOLUTION/ DROPS OPHTHALMIC
Qty: 5 | Refills: 0 | Status: DISCONTINUED | COMMUNITY
Start: 2018-05-31 | End: 2022-05-25

## 2022-05-25 NOTE — REVIEW OF SYSTEMS
[Nasal Congestion] : nasal congestion [Postnasal Drip] : postnasal drip [Cough] : cough [Seasonal Allergies] : seasonal allergies [Anemia] : anemia [Negative] : Endocrine [TextBox_44] : Bradycardia [TextBox_69] : h/o gastritis

## 2022-05-25 NOTE — CONSULT LETTER
[Dear  ___] : Dear  [unfilled], [Consult Letter:] : I had the pleasure of evaluating your patient, [unfilled]. [Please see my note below.] : Please see my note below. [Consult Closing:] : Thank you very much for allowing me to participate in the care of this patient.  If you have any questions, please do not hesitate to contact me. [Sincerely,] : Sincerely, [FreeTextEntry3] : Logan Means MD, FCCP, D. ABSM\par Pulmonary and Sleep Medicine\par [facility] at [place]

## 2022-05-25 NOTE — DISCUSSION/SUMMARY
[FreeTextEntry1] : \par #1. AutoCPAP at 14-18 cm of water to treat severe LENY with an AHI of 62.5. He was initially started on CPAP but changed to autoCPAP for mild to moderate LENY. He still has mild LENY with an AHI 8.7 but now improved from previous at the current pressures\par #2. Diet and exercise for weight loss\par #3. Replace equipment as needed; gave pt Airtouch F20 med FFM in the past and he feels that he is doing better with it despite the residual mild to moderate LENY noted on the compliance; He tried AirFit F30 mask but did not tolerate so is using his Airtouch mask again. Gave pt AirFit F20 to try to decrease leak and improve compliance; ordered 6/30/20\par #4. F/u in 6 months with compliance and PFTs\par #5. ProAir as needed for ? intermittent asthma\par #6. Flonase for PNDS as needed\par #7. He is using SoClean\par #8. Pt had both Covid vaccines and booster\par #9. Prior spirometry was normal; will repeat PFTs\par #10. Reviewed risks of exposure and symptoms of Covid-19 virus, including how the virus is spread and precautions to avoid emily virus.\par \par The patient expressed understanding and agreement with the above recommendations/plan and accepts responsibility to be compliant with recommended testing, therapies, and f/u visits.\par All relevant questions and concerns were addressed.

## 2022-05-25 NOTE — PHYSICAL EXAM
[No Acute Distress] : no acute distress [Well Nourished] : well nourished [Well Developed] : well developed [Low Lying Soft Palate] : low lying soft palate [Elongated Uvula] : elongated uvula [Enlarged Base of the Tongue] : enlarged base of the tongue [III] : Mallampati Class: III [Normal Appearance] : normal appearance [Supple] : supple [Normal Rate/Rhythm] : normal rate/rhythm [Normal S1, S2] : normal s1, s2 [No Murmurs] : no murmurs [No Resp Distress] : no resp distress [No Acc Muscle Use] : no acc muscle use [Normal Rhythm and Effort] : normal rhythm and effort [Clear to Auscultation Bilaterally] : clear to auscultation bilaterally [No Abnormalities] : no abnormalities [Benign] : benign [Not Tender] : not tender [Soft] : soft [No Clubbing] : no clubbing [No Edema] : no edema [No Focal Deficits] : no focal deficits [Oriented x3] : oriented x3 [TextBox_11] : Moderate oropharyngeal crowding.

## 2022-05-25 NOTE — HISTORY OF PRESENT ILLNESS
[Intermittent] : intermittent [Doing Well] : doing well [Well Controlled] : Well controlled [Goals--Doing Well] : the patient is doing well with ~his/her~ asthma goals [CPAP] : CPAP [Good Compliance] : good compliance with treatment [Good Tolerance] : good tolerance of treatment [Good Symptom Control] : good symptom control [On ___] : performed on [unfilled] [Patient] : the patient [To Assess ___] : to assess [unfilled] [None] : no new symptoms reported [Never] : never [Former] : former [Follow-Up - Routine Clinic] : a routine clinic follow-up of [Excess Weight] : excess weight [Currently Experiencing] : The patient is currently experiencing symptoms. [Dyspnea] : dyspnea [Low Calorie Diet] : low calorie diet [Fair Compliance] : fair compliance with treatment [Fair Tolerance] : fair tolerance of treatment [Poor Symptom Control] : poor symptom control [Sleep Apnea] : sleep apnea [High] : high [Low Calorie] : low calorie [Well Balanced Diet] : well balanced meals [___ Times/Week] : exercises [unfilled] times per week [Aerobic Conditioning] : aerobic conditioning [TextBox_4] : Patient c/o SOBOE but is otherwise without associated respiratory complaints.\par \par He was followed by Dr. Villareal for his abnormal chest CT/nodules which reportedly have been stable and for his asthma for which I have prescribed ProAir as needed.\par He reports improvement in his asthma and PNDS and GERD symptoms since starting CPAP with improvement in his cough as well.\par Pt is former smoker of 2 ppd x 8 years, quit 1972 [TextBox_11] : 2 [TextBox_13] : 8 [YearQuit] : 1972 [de-identified] : at 14 cm of water  [FreeTextEntry9] : Chest CT [FreeTextEntry8] : Resolution of GGO seen 5/22/18

## 2022-12-23 ENCOUNTER — APPOINTMENT (OUTPATIENT)
Dept: PULMONOLOGY | Facility: CLINIC | Age: 72
End: 2022-12-23

## 2023-04-05 ENCOUNTER — APPOINTMENT (OUTPATIENT)
Dept: PULMONOLOGY | Facility: CLINIC | Age: 73
End: 2023-04-05
Payer: MEDICARE

## 2023-04-05 VITALS — WEIGHT: 208 LBS | BODY MASS INDEX: 29.78 KG/M2 | HEIGHT: 70 IN

## 2023-04-05 VITALS
RESPIRATION RATE: 16 BRPM | SYSTOLIC BLOOD PRESSURE: 122 MMHG | OXYGEN SATURATION: 98 % | DIASTOLIC BLOOD PRESSURE: 62 MMHG | HEART RATE: 55 BPM

## 2023-04-05 PROCEDURE — 94729 DIFFUSING CAPACITY: CPT

## 2023-04-05 PROCEDURE — 85018 HEMOGLOBIN: CPT | Mod: QW

## 2023-04-05 PROCEDURE — 94010 BREATHING CAPACITY TEST: CPT

## 2023-04-05 PROCEDURE — 94727 GAS DIL/WSHOT DETER LNG VOL: CPT

## 2023-04-05 PROCEDURE — 99214 OFFICE O/P EST MOD 30 MIN: CPT | Mod: 25

## 2023-04-05 RX ORDER — CHLORTHALIDONE 50 MG/1
TABLET ORAL
Refills: 0 | Status: ACTIVE | COMMUNITY

## 2023-04-07 NOTE — PROCEDURE
[FreeTextEntry1] : Prior spirometry was normal.\par PFTs 4/5/23 - normal though with slightly reduced lung volumes.

## 2023-04-07 NOTE — CONSULT LETTER
[Dear  ___] : Dear  [unfilled], [Consult Letter:] : I had the pleasure of evaluating your patient, [unfilled]. [Please see my note below.] : Please see my note below. [Consult Closing:] : Thank you very much for allowing me to participate in the care of this patient.  If you have any questions, please do not hesitate to contact me. [Sincerely,] : Sincerely, [FreeTextEntry3] : Logan Means MD, FCCP, D. ABSM\par Pulmonary and Sleep Medicine\par [facility] at [place]

## 2023-04-07 NOTE — DISCUSSION/SUMMARY
[FreeTextEntry1] : \par #1. AutoCPAP at 14-18 cm of water to treat severe LENY with an AHI of 62.5. He was initially started on CPAP but changed to autoCPAP for mild to moderate LENY. He still has mild LENY with an AHI 5.1 but improved from previous at the current pressures\par #2. Diet and exercise for weight loss\par #3. Replace equipment as needed; ordered 4/5/23\par #4. F/u in 6 months with compliance \par #5. ProAir as needed for ? intermittent asthma\par #6. Flonase for PNDS as needed\par #7. He is using SoClean\par #8. Pt had both Covid vaccines and booster\par #9. Prior spirometry was normal; will repeat PFTs\par #10. Abd CT from 5/25/22 revealed a 5 mm LLL nodule; asked pt to have this CT c/w prior chest CTs from [facility] which he will arrange\par #11. Reviewed risks of exposure and symptoms of Covid-19 virus, including how the virus is spread and precautions to avoid emily virus.\par \par The patient expressed understanding and agreement with the above recommendations/plan and accepts responsibility to be compliant with recommended testing, therapies, and f/u visits.\par All relevant questions and concerns were addressed.

## 2023-04-07 NOTE — HISTORY OF PRESENT ILLNESS
[Intermittent] : intermittent [Doing Well] : doing well [Well Controlled] : Well controlled [Goals--Doing Well] : the patient is doing well with ~his/her~ asthma goals [CPAP] : CPAP [Good Compliance] : good compliance with treatment [Good Tolerance] : good tolerance of treatment [Good Symptom Control] : good symptom control [Follow-Up - Routine Clinic] : a routine clinic follow-up of [Excess Weight] : excess weight [Currently Experiencing] : The patient is currently experiencing symptoms. [Dyspnea] : dyspnea [Low Calorie Diet] : low calorie diet [Fair Compliance] : fair compliance with treatment [Fair Tolerance] : fair tolerance of treatment [Poor Symptom Control] : poor symptom control [Sleep Apnea] : sleep apnea [High] : high [Low Calorie] : low calorie [Well Balanced Diet] : well balanced meals [___ Times/Week] : exercises [unfilled] times per week [Aerobic Conditioning] : aerobic conditioning [On ___] : performed on [unfilled] [Patient] : the patient [To Assess ___] : to assess [unfilled] [None] : no new symptoms reported [TextBox_4] : Patient c/o SOBOE but is otherwise without associated respiratory complaints.\par \par He was followed by Dr. Villareal for his abnormal chest CT/nodules which reportedly have been stable and for his asthma for which I have prescribed ProAir as needed.\par He reports improvement in his asthma and PNDS and GERD symptoms since starting CPAP with improvement in his cough as well.\par Pt is former smoker of 2 ppd x 8 years, quit 1972 [de-identified] : at 14 cm of water  [FreeTextEntry9] : Chest CT [FreeTextEntry8] : Resolution of GGO seen 5/22/18

## 2023-10-02 ENCOUNTER — APPOINTMENT (OUTPATIENT)
Dept: PULMONOLOGY | Facility: CLINIC | Age: 73
End: 2023-10-02
Payer: MEDICARE

## 2023-10-02 VITALS
HEIGHT: 70 IN | BODY MASS INDEX: 29.78 KG/M2 | SYSTOLIC BLOOD PRESSURE: 122 MMHG | HEART RATE: 57 BPM | RESPIRATION RATE: 16 BRPM | WEIGHT: 208 LBS | OXYGEN SATURATION: 98 % | DIASTOLIC BLOOD PRESSURE: 70 MMHG

## 2023-10-02 PROCEDURE — 99214 OFFICE O/P EST MOD 30 MIN: CPT

## 2023-10-02 RX ORDER — METHYLPREDNISOLONE 4 MG/1
4 TABLET ORAL
Qty: 1 | Refills: 0 | Status: ACTIVE | COMMUNITY
Start: 2023-10-02 | End: 1900-01-01

## 2023-12-21 ENCOUNTER — APPOINTMENT (OUTPATIENT)
Dept: PULMONOLOGY | Facility: CLINIC | Age: 73
End: 2023-12-21

## 2024-05-22 ENCOUNTER — APPOINTMENT (OUTPATIENT)
Dept: PULMONOLOGY | Facility: CLINIC | Age: 74
End: 2024-05-22
Payer: MEDICARE

## 2024-05-22 VITALS
RESPIRATION RATE: 16 BRPM | DIASTOLIC BLOOD PRESSURE: 72 MMHG | BODY MASS INDEX: 29.16 KG/M2 | WEIGHT: 206 LBS | HEART RATE: 57 BPM | HEIGHT: 70.5 IN | SYSTOLIC BLOOD PRESSURE: 118 MMHG | OXYGEN SATURATION: 98 %

## 2024-05-22 DIAGNOSIS — Z87.891 PERSONAL HISTORY OF NICOTINE DEPENDENCE: ICD-10-CM

## 2024-05-22 DIAGNOSIS — J31.0 CHRONIC RHINITIS: ICD-10-CM

## 2024-05-22 PROCEDURE — 99215 OFFICE O/P EST HI 40 MIN: CPT | Mod: 25

## 2024-05-22 PROCEDURE — 95012 NITRIC OXIDE EXP GAS DETER: CPT

## 2024-05-22 RX ORDER — FEXOFENADINE HYDROCHLORIDE 60 MG/1
TABLET, FILM COATED ORAL
Refills: 0 | Status: ACTIVE | COMMUNITY

## 2024-05-22 NOTE — CONSULT LETTER
[Dear  ___] : Dear  [unfilled], [Consult Letter:] : I had the pleasure of evaluating your patient, [unfilled]. [Please see my note below.] : Please see my note below. [Consult Closing:] : Thank you very much for allowing me to participate in the care of this patient.  If you have any questions, please do not hesitate to contact me. [Sincerely,] : Sincerely, [FreeTextEntry3] : Logan Means MD, FCCP, D. ABSM\par  Pulmonary and Sleep Medicine\par  [facility] at [place]

## 2024-05-22 NOTE — END OF VISIT
[Time Spent: ___ minutes] : I have spent [unfilled] minutes of time on the encounter. [TextEntry] : Discussed with pt at length regarding LENY, reviewed results, weight issues, PNDS; reviewed prior w/u with pt as above. Repair Type: Complex Repair

## 2024-05-22 NOTE — DISCUSSION/SUMMARY
[FreeTextEntry1] : #1. AutoCPAP at 14-18 cm of water to treat severe LENY with an AHI of 62.5. He was initially started on CPAP but changed to autoCPAP for mild to moderate LENY. He still has mild LENY with an AHI 5.1-5.5 but improved from previous at the current pressures. Discussed other options including Inspire per pt's request though would like to hold off for now. #2. Diet and exercise for weight loss #3. Replace equipment as needed; ordered 5/22/24. #4. Abd CT from 5/25/22 revealed a 5 mm LLL nodule; asked pt to have this CT c/w prior chest CTs from [facility] which he will arrange and did not have done for this visit, so I have requested again but will order a chest CT for stability. #5. ProAir as needed for ? intermittent asthma. Pt was started on Advair per his allergist so will obtain a MCT and PFTs for further evaluation. FeNO from 5/22/24 was low at 22 ppb though may be related to recent Advair use. #6. Flonase for PNDS as needed. #7. He is using SoClean. #8. Pt had both Covid vaccines and booster. #9. Prior spirometry was normal; repeat PFTs were with a normal porsche but slightly reduced lung volumes. Repeat porsche with next visit. #10. F/u in 2-3 months with compliance, chest CT, porsche, and MCT. #11. Did well with Medrol dose linda for mild residual cough since Covid infection and no longer requires. #12. Need for chronic BD therapy will be assess based on the above w/u.  The patient expressed understanding and agreement with the above recommendations/plan and accepts responsibility to be compliant with recommended testing, therapies, and f/u visits. All relevant questions and concerns were addressed.

## 2024-05-22 NOTE — RESULTS/DATA
[TextEntry] : Abd CT from 5/25/22 revealed a 5 mm LLL nodule; asked pt to have this CT c/w prior chest CTs from [facility] which he will arrange.

## 2024-05-22 NOTE — HISTORY OF PRESENT ILLNESS
[Intermittent] : intermittent [Doing Well] : doing well [Well Controlled] : Well controlled [Goals--Doing Well] : the patient is doing well with ~his/her~ asthma goals [CPAP] : CPAP [Good Compliance] : good compliance with treatment [Good Tolerance] : good tolerance of treatment [Good Symptom Control] : good symptom control [Follow-Up - Routine Clinic] : a routine clinic follow-up of [Excess Weight] : excess weight [Currently Experiencing] : The patient is currently experiencing symptoms. [Dyspnea] : dyspnea [Low Calorie Diet] : low calorie diet [Fair Compliance] : fair compliance with treatment [Fair Tolerance] : fair tolerance of treatment [Poor Symptom Control] : poor symptom control [Sleep Apnea] : sleep apnea [High] : high [Low Calorie] : low calorie [Well Balanced Diet] : well balanced meals [___ Times/Week] : exercises [unfilled] times per week [Aerobic Conditioning] : aerobic conditioning [On ___] : performed on [unfilled] [Patient] : the patient [To Assess ___] : to assess [unfilled] [None] : no new symptoms reported [TextBox_4] : Patient c/o SOBOE but is otherwise without associated respiratory complaints. He was followed by Dr. Villareal for his abnormal chest CT/nodules which reportedly have been stable and for his asthma for which I have prescribed ProAir as needed. He reports improvement in his asthma and PNDS and GERD symptoms since starting CPAP with improvement in his cough as well. Pt is former smoker of 2 ppd x 8 years, quit 1972. S/p Covid infection multiple times with mild symptoms and recently early 9/2023 again with mild symptoms and did not require therapy. Pt reports being seen by allergist and ENT and reportedly is allergic to wheat. He reports that he is feeling much better since he has decreased his wheat consumption but was also started on Advair by his allergist but for unclear reasons.  [de-identified] : at 14 cm of water  [FreeTextEntry9] : Chest CT [FreeTextEntry8] : Resolution of GGO seen 5/22/18 [TextEntry] : Chest CT imaging as above.  S/N PSG from 12/15/17 revealed severe LENY with an AHI of 62.5 and an optimal pressure of 11 cm of water  The patient's overall compliance is 100% with a >4hr compliance of 100% on autoCPAP with a median and max pressure of 14.5-14.8 and 17.4-17.6 cm of water, respectively with a residual AHI of 5.1-7.7 which is c/w mild LENY but improved from previous and acceptable; currently down to 5.1

## 2024-06-17 ENCOUNTER — APPOINTMENT (OUTPATIENT)
Dept: PULMONOLOGY | Facility: CLINIC | Age: 74
End: 2024-06-17
Payer: MEDICARE

## 2024-06-17 VITALS
OXYGEN SATURATION: 98 % | HEART RATE: 50 BPM | SYSTOLIC BLOOD PRESSURE: 123 MMHG | DIASTOLIC BLOOD PRESSURE: 76 MMHG | RESPIRATION RATE: 16 BRPM

## 2024-06-17 VITALS — BODY MASS INDEX: 28.14 KG/M2 | HEIGHT: 71 IN | WEIGHT: 201 LBS

## 2024-06-17 DIAGNOSIS — R06.02 SHORTNESS OF BREATH: ICD-10-CM

## 2024-06-17 DIAGNOSIS — R05.9 COUGH, UNSPECIFIED: ICD-10-CM

## 2024-06-17 DIAGNOSIS — G47.33 OBSTRUCTIVE SLEEP APNEA (ADULT) (PEDIATRIC): ICD-10-CM

## 2024-06-17 DIAGNOSIS — R91.8 OTHER NONSPECIFIC ABNORMAL FINDING OF LUNG FIELD: ICD-10-CM

## 2024-06-17 DIAGNOSIS — R09.82 POSTNASAL DRIP: ICD-10-CM

## 2024-06-17 DIAGNOSIS — E66.3 OVERWEIGHT: ICD-10-CM

## 2024-06-17 PROCEDURE — 94010 BREATHING CAPACITY TEST: CPT

## 2024-06-17 PROCEDURE — 94727 GAS DIL/WSHOT DETER LNG VOL: CPT

## 2024-06-17 PROCEDURE — 94729 DIFFUSING CAPACITY: CPT

## 2024-06-17 PROCEDURE — 85018 HEMOGLOBIN: CPT | Mod: QW

## 2024-06-17 PROCEDURE — 99214 OFFICE O/P EST MOD 30 MIN: CPT | Mod: 25

## 2024-06-17 NOTE — HISTORY OF PRESENT ILLNESS
[Intermittent] : intermittent [Doing Well] : doing well [Well Controlled] : Well controlled [Goals--Doing Well] : the patient is doing well with ~his/her~ asthma goals [CPAP] : CPAP [Good Compliance] : good compliance with treatment [Good Tolerance] : good tolerance of treatment [Good Symptom Control] : good symptom control [Follow-Up - Routine Clinic] : a routine clinic follow-up of [Excess Weight] : excess weight [Currently Experiencing] : The patient is currently experiencing symptoms. [Dyspnea] : dyspnea [Low Calorie Diet] : low calorie diet [Fair Compliance] : fair compliance with treatment [Fair Tolerance] : fair tolerance of treatment [Poor Symptom Control] : poor symptom control [Sleep Apnea] : sleep apnea [High] : high [Low Calorie] : low calorie [Well Balanced Diet] : well balanced meals [___ Times/Week] : exercises [unfilled] times per week [Aerobic Conditioning] : aerobic conditioning [On ___] : performed on [unfilled] [Patient] : the patient [To Assess ___] : to assess [unfilled] [None] : no new symptoms reported [TextBox_4] : Patient c/o SOBOE but is otherwise without associated respiratory complaints. He was followed by Dr. Villareal for his abnormal chest CT/nodules which reportedly have been stable and for his asthma for which I have prescribed ProAir as needed. He reports improvement in his asthma and PNDS and GERD symptoms since starting CPAP with improvement in his cough as well. Pt is former smoker of 2 ppd x 8 years, quit 1972. S/p Covid infection multiple times with mild symptoms and recently early 9/2023 again with mild symptoms and did not require therapy. Pt reports being seen by allergist and ENT and reportedly is allergic to wheat. He reports that he is feeling much better since he has decreased his wheat consumption but was also started on Advair by his allergist but for unclear reasons.  [de-identified] : at 14 cm of water  [FreeTextEntry9] : Chest CT [FreeTextEntry8] : Resolution of GGO seen 5/22/18 [TextEntry] : Chest CT imaging as above.  Abd CT from 5/25/22 revealed a 5 mm LLL nodule; asked pt to have this CT c/w prior chest CTs from [facility] which he will arrange. Chest CT from 6/6/24 with MICHAEL post-inflammatory changes and multiple b/l subcm nodules; some stable c/w prior abd CT but will need c/w prior chest CTs from [facility].

## 2024-06-17 NOTE — CONSULT LETTER
[Dear  ___] : Dear  [unfilled], [Consult Letter:] : I had the pleasure of evaluating your patient, [unfilled]. [Please see my note below.] : Please see my note below. [Consult Closing:] : Thank you very much for allowing me to participate in the care of this patient.  If you have any questions, please do not hesitate to contact me. [Sincerely,] : Sincerely, [FreeTextEntry3] : Logan Means MD, FCCP, D. ABSM\par  Pulmonary and Sleep Medicine\par  [facility] [place]

## 2024-06-17 NOTE — RESULTS/DATA
[TextEntry] : S/N PSG from 12/15/17 revealed severe LENY with an AHI of 62.5 and an optimal pressure of 11 cm of water  The patient's overall compliance is 100% with a >4hr compliance of 100% on autoCPAP with a median and max pressure of 14.5-14.8 and 17.4-17.6 cm of water, respectively with a residual AHI of 5.1-7.7 which is c/w mild LENY but improved from previous and acceptable, currently down to 3.9-5.1 and now normal.

## 2024-06-17 NOTE — DISCUSSION/SUMMARY
[FreeTextEntry1] : #1. AutoCPAP at 14-18 cm of water to treat severe LENY with an AHI of 62.5. He was initially started on CPAP but changed to autoCPAP for mild to moderate LENY. He still has mild LENY with an AHI 5.1-5.5 but improved from previous at the current pressures. Discussed other options including Inspire per pt's request though would like to hold off for now. #2. Diet and exercise for weight loss #3. Replace equipment as needed; ordered 5/22/24. #4. Abd CT from 5/25/22 revealed a 5 mm LLL nodule; asked pt to have this CT c/w prior chest CTs from [facility] which he will arrange and did not have done for this visit, so I have requested again but will order a chest CT for stability. #5. ProAir as needed for ? intermittent asthma. Pt was started on Advair per his allergist so will obtain a MCT when able to schedule for further evaluation. FeNO from 5/22/24 was low at 22 ppb though may be related to recent Advair use. #6. Flonase for PNDS as needed. #7. He is no longer using SoClean. #8. Pt had both Covid vaccines and booster. #9. Prior spirometry was normal; repeat PFTs were with a normal porsche but slightly reduced lung volumes. Repeat porsche with next visit. #10. F/u in 3 months with compliance, chest CT comparison and MCT if done. #11. Did well with Medrol dose linda for mild residual cough since Covid infection and no longer requires.  The patient expressed understanding and agreement with the above recommendations/plan and accepts responsibility to be compliant with recommended testing, therapies, and f/u visits. All relevant questions and concerns were addressed.

## 2024-06-17 NOTE — PROCEDURE
[FreeTextEntry1] : Prior spirometry was normal. PFTs 4/5/23 - normal though with slightly reduced lung volumes. PFTs 6/17/24 - Normal.

## 2024-06-17 NOTE — END OF VISIT
[Time Spent: ___ minutes] : I have spent [unfilled] minutes of time on the encounter. [TextEntry] : Discussed with pt at length regarding LENY, reviewed results, weight issues, PNDS; reviewed prior w/u with pt as above.

## 2024-07-23 ENCOUNTER — NON-APPOINTMENT (OUTPATIENT)
Age: 74
End: 2024-07-23

## 2024-07-23 DIAGNOSIS — R91.8 OTHER NONSPECIFIC ABNORMAL FINDING OF LUNG FIELD: ICD-10-CM

## 2024-09-19 ENCOUNTER — APPOINTMENT (OUTPATIENT)
Dept: PULMONOLOGY | Facility: CLINIC | Age: 74
End: 2024-09-19
Payer: MEDICARE

## 2024-09-19 VITALS
SYSTOLIC BLOOD PRESSURE: 110 MMHG | WEIGHT: 202 LBS | HEART RATE: 48 BPM | BODY MASS INDEX: 28.28 KG/M2 | OXYGEN SATURATION: 98 % | HEIGHT: 71 IN | RESPIRATION RATE: 16 BRPM | DIASTOLIC BLOOD PRESSURE: 62 MMHG

## 2024-09-19 DIAGNOSIS — R91.8 OTHER NONSPECIFIC ABNORMAL FINDING OF LUNG FIELD: ICD-10-CM

## 2024-09-19 DIAGNOSIS — E66.3 OVERWEIGHT: ICD-10-CM

## 2024-09-19 DIAGNOSIS — R09.82 POSTNASAL DRIP: ICD-10-CM

## 2024-09-19 DIAGNOSIS — Z87.891 PERSONAL HISTORY OF NICOTINE DEPENDENCE: ICD-10-CM

## 2024-09-19 DIAGNOSIS — R06.02 SHORTNESS OF BREATH: ICD-10-CM

## 2024-09-19 DIAGNOSIS — R05.9 COUGH, UNSPECIFIED: ICD-10-CM

## 2024-09-19 DIAGNOSIS — G47.33 OBSTRUCTIVE SLEEP APNEA (ADULT) (PEDIATRIC): ICD-10-CM

## 2024-09-19 PROCEDURE — G2211 COMPLEX E/M VISIT ADD ON: CPT

## 2024-09-19 PROCEDURE — 99214 OFFICE O/P EST MOD 30 MIN: CPT

## 2024-09-19 NOTE — END OF VISIT
[Time Spent: ___ minutes] : I have spent [unfilled] minutes of time on the encounter which excludes teaching and separately reported services. [TextEntry] : Discussed with pt at length regarding LENY, reviewed results, weight issues, PNDS; reviewed prior w/u with pt as above.

## 2024-09-19 NOTE — CONSULT LETTER
[Dear  ___] : Dear  [unfilled], [Consult Letter:] : I had the pleasure of evaluating your patient, [unfilled]. [Please see my note below.] : Please see my note below. [Consult Closing:] : Thank you very much for allowing me to participate in the care of this patient.  If you have any questions, please do not hesitate to contact me. [Sincerely,] : Sincerely, [FreeTextEntry3] : Logan Means MD, FCCP, D. ABSM\par  Pulmonary and Sleep Medicine\par  [facility] at [place]

## 2024-09-19 NOTE — HISTORY OF PRESENT ILLNESS
[Intermittent] : intermittent [Doing Well] : doing well [Well Controlled] : Well controlled [Goals--Doing Well] : the patient is doing well with ~his/her~ asthma goals [Good Compliance] : good compliance with treatment [Good Tolerance] : good tolerance of treatment [Good Symptom Control] : good symptom control [Follow-Up - Routine Clinic] : a routine clinic follow-up of [Excess Weight] : excess weight [Currently Experiencing] : The patient is currently experiencing symptoms. [Dyspnea] : dyspnea [Low Calorie Diet] : low calorie diet [Fair Compliance] : fair compliance with treatment [Fair Tolerance] : fair tolerance of treatment [Poor Symptom Control] : poor symptom control [Sleep Apnea] : sleep apnea [High] : high [Low Calorie] : low calorie [Well Balanced Diet] : well balanced meals [___ Times/Week] : exercises [unfilled] times per week [Aerobic Conditioning] : aerobic conditioning [On ___] : performed on [unfilled] [To Assess ___] : to assess [unfilled] [Patient] : the patient [None] : no new symptoms reported [TextBox_4] : Patient c/o SOBOE but is otherwise without associated respiratory complaints. He was followed by Dr. Villareal for his abnormal chest CT/nodules which reportedly have been stable and for his asthma for which I have prescribed ProAir as needed. He reports improvement in his asthma and PNDS and GERD symptoms since starting CPAP with improvement in his cough as well. Pt is former smoker of 2 ppd x 8 years, quit 1972. S/p Covid infection multiple times with mild symptoms and recently early 9/2023 again with mild symptoms and did not require therapy. Pt reports being seen by allergist and ENT and reportedly is allergic to wheat. He reports that he is feeling much better since he has decreased his wheat consumption but was also started on Advair by his allergist but for unclear reasons.  [de-identified] : APAP at 14-18 cm of water  [FreeTextEntry9] : Chest CT [FreeTextEntry8] : Resolution of GGO seen 5/22/18 [TextEntry] : Chest CT imaging as above.  Abd CT from 5/25/22 revealed a 5 mm LLL nodule; asked pt to have this CT c/w prior chest CTs from [facility] which he will arrange. Chest CT from 6/6/24 with MICHAEL post-inflammatory changes and multiple b/l subcm nodules; most stable and possible new 0.6 mm though may be mucous plugs. PET CT from 8/2/24 without significant uptake in opacities.

## 2024-09-19 NOTE — DISCUSSION/SUMMARY
[FreeTextEntry1] : #1. AutoCPAP at 14-18 cm of water to treat severe LENY with an AHI of 62.5. He was initially started on CPAP but changed to autoCPAP for mild to moderate LENY. He still has mild LENY with an AHI 5.1-5.5 but improved from previous at the current pressures. Discussed other options including Inspire per pt's request though would like to hold off for now. #2. Diet and exercise for weight loss #3. Replace equipment as needed; ordered 5/22/24. #4. Abd CT from 5/25/22 revealed a 5 mm LLL nodule. Subsequent CT with possible new 6 mm nodule but negative on PET CT and may be mucous plugs. Repeat CT in 4 months for f/u. #5. ProAir as needed for ? intermittent asthma. Pt was started on Advair per his allergist so will obtain a MCT when able to schedule for further evaluation. FeNO from 5/22/24 was low at 22 ppb though may be related to recent Advair use. #6. Flonase for PNDS as needed. #7. He is no longer using SoClean. #8. Pt had both Covid vaccines and booster. S/p Covid infection #9. Prior spirometry was normal; repeat PFTs were with a normal porsche but slightly reduced lung volumes. Repeat porsche with next visit. #10. F/u in 4-5 months with compliance and chest CT.  The patient expressed understanding and agreement with the above recommendations/plan and accepts responsibility to be compliant with recommended testing, therapies, and f/u visits. All relevant questions and concerns were addressed.

## 2025-06-18 ENCOUNTER — NON-APPOINTMENT (OUTPATIENT)
Age: 75
End: 2025-06-18

## 2025-06-20 ENCOUNTER — APPOINTMENT (OUTPATIENT)
Dept: PULMONOLOGY | Facility: CLINIC | Age: 75
End: 2025-06-20
Payer: MEDICARE

## 2025-06-20 VITALS
DIASTOLIC BLOOD PRESSURE: 70 MMHG | WEIGHT: 193 LBS | BODY MASS INDEX: 27.02 KG/M2 | HEART RATE: 57 BPM | SYSTOLIC BLOOD PRESSURE: 126 MMHG | OXYGEN SATURATION: 98 % | HEIGHT: 71 IN | RESPIRATION RATE: 16 BRPM

## 2025-06-20 PROCEDURE — G2211 COMPLEX E/M VISIT ADD ON: CPT

## 2025-06-20 PROCEDURE — 99214 OFFICE O/P EST MOD 30 MIN: CPT

## 2025-09-03 ENCOUNTER — APPOINTMENT (OUTPATIENT)
Dept: PULMONOLOGY | Facility: CLINIC | Age: 75
End: 2025-09-03
Payer: MEDICARE

## 2025-09-03 VITALS
WEIGHT: 196 LBS | HEART RATE: 45 BPM | DIASTOLIC BLOOD PRESSURE: 74 MMHG | BODY MASS INDEX: 28.06 KG/M2 | HEIGHT: 70 IN | RESPIRATION RATE: 16 BRPM | SYSTOLIC BLOOD PRESSURE: 132 MMHG | OXYGEN SATURATION: 99 %

## 2025-09-03 DIAGNOSIS — R91.8 OTHER NONSPECIFIC ABNORMAL FINDING OF LUNG FIELD: ICD-10-CM

## 2025-09-03 DIAGNOSIS — R06.02 SHORTNESS OF BREATH: ICD-10-CM

## 2025-09-03 DIAGNOSIS — R09.82 POSTNASAL DRIP: ICD-10-CM

## 2025-09-03 DIAGNOSIS — E66.3 OVERWEIGHT: ICD-10-CM

## 2025-09-03 DIAGNOSIS — Z87.891 PERSONAL HISTORY OF NICOTINE DEPENDENCE: ICD-10-CM

## 2025-09-03 DIAGNOSIS — J84.10 PULMONARY FIBROSIS, UNSPECIFIED: ICD-10-CM

## 2025-09-03 DIAGNOSIS — R05.9 COUGH, UNSPECIFIED: ICD-10-CM

## 2025-09-03 DIAGNOSIS — G47.33 OBSTRUCTIVE SLEEP APNEA (ADULT) (PEDIATRIC): ICD-10-CM

## 2025-09-03 PROCEDURE — G2211 COMPLEX E/M VISIT ADD ON: CPT

## 2025-09-03 PROCEDURE — 99214 OFFICE O/P EST MOD 30 MIN: CPT
